# Patient Record
Sex: FEMALE | Race: WHITE | NOT HISPANIC OR LATINO
[De-identification: names, ages, dates, MRNs, and addresses within clinical notes are randomized per-mention and may not be internally consistent; named-entity substitution may affect disease eponyms.]

---

## 2017-08-02 ENCOUNTER — APPOINTMENT (OUTPATIENT)
Dept: OBGYN | Facility: CLINIC | Age: 57
End: 2017-08-02

## 2017-08-25 ENCOUNTER — APPOINTMENT (OUTPATIENT)
Dept: INFECTIOUS DISEASE | Facility: CLINIC | Age: 57
End: 2017-08-25
Payer: SELF-PAY

## 2017-08-25 PROCEDURE — 90717 YELLOW FEVER VACCINE SUBQ: CPT

## 2017-08-25 PROCEDURE — 99401 PREV MED CNSL INDIV APPRX 15: CPT | Mod: 25

## 2017-08-25 PROCEDURE — 90473 IMMUNE ADMIN ORAL/NASAL: CPT | Mod: NC

## 2017-08-25 PROCEDURE — 90472 IMMUNIZATION ADMIN EACH ADD: CPT | Mod: NC

## 2017-08-25 PROCEDURE — 90715 TDAP VACCINE 7 YRS/> IM: CPT

## 2017-08-25 PROCEDURE — 90690 TYPHOID VACCINE ORAL: CPT

## 2017-08-25 PROCEDURE — 90636 HEP A/HEP B VACC ADULT IM: CPT

## 2017-09-22 ENCOUNTER — APPOINTMENT (OUTPATIENT)
Dept: INFECTIOUS DISEASE | Facility: CLINIC | Age: 57
End: 2017-09-22
Payer: SELF-PAY

## 2017-09-22 PROCEDURE — 90471 IMMUNIZATION ADMIN: CPT | Mod: NC

## 2017-09-22 PROCEDURE — 90636 HEP A/HEP B VACC ADULT IM: CPT

## 2017-10-03 ENCOUNTER — APPOINTMENT (OUTPATIENT)
Dept: OBGYN | Facility: CLINIC | Age: 57
End: 2017-10-03

## 2017-10-19 ENCOUNTER — RX RENEWAL (OUTPATIENT)
Age: 57
End: 2017-10-19

## 2017-10-29 RX ORDER — ATOVAQUONE AND PROGUANIL HYDROCHLORIDE 250; 100 MG/1; MG/1
250-100 TABLET, FILM COATED ORAL
Qty: 7 | Refills: 0 | Status: ACTIVE | COMMUNITY
Start: 2017-08-25

## 2017-12-04 ENCOUNTER — RESULT REVIEW (OUTPATIENT)
Age: 57
End: 2017-12-04

## 2017-12-05 ENCOUNTER — APPOINTMENT (OUTPATIENT)
Dept: OBGYN | Facility: CLINIC | Age: 57
End: 2017-12-05
Payer: COMMERCIAL

## 2017-12-05 PROCEDURE — 99396 PREV VISIT EST AGE 40-64: CPT

## 2017-12-05 PROCEDURE — 36415 COLL VENOUS BLD VENIPUNCTURE: CPT

## 2017-12-11 ENCOUNTER — APPOINTMENT (OUTPATIENT)
Dept: OBGYN | Facility: CLINIC | Age: 57
End: 2017-12-11

## 2018-03-02 ENCOUNTER — APPOINTMENT (OUTPATIENT)
Dept: INFECTIOUS DISEASE | Facility: CLINIC | Age: 58
End: 2018-03-02

## 2018-04-13 ENCOUNTER — APPOINTMENT (OUTPATIENT)
Dept: INFECTIOUS DISEASE | Facility: CLINIC | Age: 58
End: 2018-04-13
Payer: SELF-PAY

## 2018-04-13 PROCEDURE — 90471 IMMUNIZATION ADMIN: CPT | Mod: NC

## 2018-04-13 PROCEDURE — 90636 HEP A/HEP B VACC ADULT IM: CPT

## 2018-12-13 ENCOUNTER — APPOINTMENT (OUTPATIENT)
Dept: OBGYN | Facility: CLINIC | Age: 58
End: 2018-12-13

## 2019-01-24 ENCOUNTER — APPOINTMENT (OUTPATIENT)
Dept: OBGYN | Facility: CLINIC | Age: 59
End: 2019-01-24
Payer: COMMERCIAL

## 2019-01-24 ENCOUNTER — RESULT REVIEW (OUTPATIENT)
Age: 59
End: 2019-01-24

## 2019-01-24 PROCEDURE — 36415 COLL VENOUS BLD VENIPUNCTURE: CPT

## 2019-01-24 PROCEDURE — 99396 PREV VISIT EST AGE 40-64: CPT

## 2019-08-27 ENCOUNTER — APPOINTMENT (OUTPATIENT)
Dept: OBGYN | Facility: CLINIC | Age: 59
End: 2019-08-27
Payer: COMMERCIAL

## 2019-08-27 PROCEDURE — 36415 COLL VENOUS BLD VENIPUNCTURE: CPT

## 2019-08-27 PROCEDURE — 99213 OFFICE O/P EST LOW 20 MIN: CPT

## 2019-09-20 PROBLEM — N64.4 PAIN OF BOTH BREASTS: Status: ACTIVE | Noted: 2019-09-20

## 2019-09-23 ENCOUNTER — APPOINTMENT (OUTPATIENT)
Dept: SURGICAL ONCOLOGY | Facility: CLINIC | Age: 59
End: 2019-09-23
Payer: COMMERCIAL

## 2019-09-23 VITALS
WEIGHT: 153 LBS | OXYGEN SATURATION: 97 % | BODY MASS INDEX: 24.59 KG/M2 | HEIGHT: 66 IN | DIASTOLIC BLOOD PRESSURE: 79 MMHG | HEART RATE: 66 BPM | SYSTOLIC BLOOD PRESSURE: 131 MMHG

## 2019-09-23 DIAGNOSIS — N64.4 MASTODYNIA: ICD-10-CM

## 2019-09-23 PROCEDURE — 99204 OFFICE O/P NEW MOD 45 MIN: CPT

## 2019-10-28 ENCOUNTER — APPOINTMENT (OUTPATIENT)
Dept: INFECTIOUS DISEASE | Facility: CLINIC | Age: 59
End: 2019-10-28

## 2019-11-04 ENCOUNTER — APPOINTMENT (OUTPATIENT)
Dept: INFECTIOUS DISEASE | Facility: CLINIC | Age: 59
End: 2019-11-04
Payer: SELF-PAY

## 2019-11-04 DIAGNOSIS — Z71.84 ENC FOR HEALTH COUNSELING RELATED TO TRAVEL: ICD-10-CM

## 2019-11-04 PROCEDURE — 99401 PREV MED CNSL INDIV APPRX 15: CPT

## 2019-11-04 RX ORDER — ATOVAQUONE AND PROGUANIL HYDROCHLORIDE 250; 100 MG/1; MG/1
250-100 TABLET, FILM COATED ORAL DAILY
Qty: 14 | Refills: 0 | Status: ACTIVE | COMMUNITY
Start: 2019-11-04 | End: 1900-01-01

## 2019-12-16 ENCOUNTER — APPOINTMENT (OUTPATIENT)
Dept: OBGYN | Facility: CLINIC | Age: 59
End: 2019-12-16
Payer: COMMERCIAL

## 2019-12-16 PROCEDURE — 99213 OFFICE O/P EST LOW 20 MIN: CPT

## 2020-07-29 ENCOUNTER — APPOINTMENT (OUTPATIENT)
Dept: OBGYN | Facility: CLINIC | Age: 60
End: 2020-07-29

## 2020-08-21 ENCOUNTER — APPOINTMENT (OUTPATIENT)
Dept: OBGYN | Facility: CLINIC | Age: 60
End: 2020-08-21
Payer: COMMERCIAL

## 2020-08-21 PROCEDURE — 36415 COLL VENOUS BLD VENIPUNCTURE: CPT

## 2020-08-21 PROCEDURE — 99213 OFFICE O/P EST LOW 20 MIN: CPT

## 2020-09-30 ENCOUNTER — APPOINTMENT (OUTPATIENT)
Dept: SURGICAL ONCOLOGY | Facility: CLINIC | Age: 60
End: 2020-09-30
Payer: COMMERCIAL

## 2020-09-30 VITALS
SYSTOLIC BLOOD PRESSURE: 134 MMHG | RESPIRATION RATE: 15 BRPM | HEIGHT: 66 IN | WEIGHT: 148.5 LBS | HEART RATE: 59 BPM | OXYGEN SATURATION: 97 % | BODY MASS INDEX: 23.87 KG/M2 | DIASTOLIC BLOOD PRESSURE: 74 MMHG

## 2020-09-30 PROCEDURE — 99214 OFFICE O/P EST MOD 30 MIN: CPT

## 2020-09-30 NOTE — PHYSICAL EXAM
[Normal] : supple, no neck mass and thyroid not enlarged [Normal Neck Lymph Nodes] : normal neck lymph nodes  [Normal Supraclavicular Lymph Nodes] : normal supraclavicular lymph nodes [Normal Axillary Lymph Nodes] : normal axillary lymph nodes [Normal] : normal appearance, no rash, nodules, vesicles, ulcers, erythema [de-identified] : Groins not examined [de-identified] : below

## 2020-09-30 NOTE — REASON FOR VISIT
[Initial Consultation] : an initial consultation for [Other: _____] : [unfilled] [FreeTextEntry2] : Abnormal breast exam, breast pain

## 2020-09-30 NOTE — ASSESSMENT
[FreeTextEntry1] : August 2019:\par Bilateral mammogram and sonogram at [facility]: BI-RADS 2.\par \par \par Clinically, I cannot determine a cause for her breast pain, sensitivity, and tenderness today.\par She will likely be resuming HRT because of significant post menopausal symptoms.\par We'll continue to self monitor itself evaluated notify me of any changes\par \par I suggested short time re imaging on her symptomatic (right) side and provided her with a prescription for a diagnostic mammogram and sonogram had a 6 month interval, February 2020.\par \par We should see her after that, sooner if needed.\par \par Reviewed in detail, all questions answered.\par \par

## 2020-09-30 NOTE — HISTORY OF PRESENT ILLNESS
[de-identified] : 58-year-old lady referred by her gynecologist Dr. Allyson MANN for breast examination.\par \par Since August 2019 she reports:\par #1. Equivocal palpable area right lower inner quadrant on Physical examination, By her gynecologist.\par Her on breast self examination is normal.\par #2. Pain/sensitivity in the lower inner portion of her right breast, and some sensitivity associated with the right nipple.\par \par In retrospect, the onset of her symptoms are temporally related to an over-application upper topical hormone replacement therapy (an estrogen/progesterone combination which she has been on since menopause at age 49.\par She stopped the medication August 27, 2019; not sure if the signs and symptoms have subsided, yet.\par \par No other concerns related to either breast.\par \par No previous breast biopsies.\par \par No family history of breast cancer.\par \par No relatives with ovarian cancer.\par \par Not Ashkenazi.\par \par Her father had prostate cancer.\par Her mother had Merkel cell cancer.\par \par Menarche at 10.\par The left breast.\par Natural menopause at 49.\par + HRT since her menopause.\par \par Breast cancer risk score 14\par \par \par Her internist is Dr. Torri FISH\par \par No pacemaker or defibrillator.\par No anticoagulants.\par \par Her only current medication is treximet for migraines.\par Her neurologist is Dr. Gisele Rodriguez\par \par \par January 2019 Pap smear, Dr. Allyson Mann, okay\par \par

## 2020-10-05 NOTE — PHYSICAL EXAM
[Normal] : supple, no neck mass and thyroid not enlarged [Normal Neck Lymph Nodes] : normal neck lymph nodes  [Normal Supraclavicular Lymph Nodes] : normal supraclavicular lymph nodes [Normal Axillary Lymph Nodes] : normal axillary lymph nodes [Normal] : normal appearance, no rash, nodules, vesicles, ulcers, erythema [de-identified] : Groins not examined [de-identified] : below

## 2020-10-05 NOTE — ASSESSMENT
[FreeTextEntry1] : She reports that she had bilateral breast imaging at [facility] in [place], which were normal.\par Await official reports: (9-28-20: Bilateral mammogram and sonogram @[facility]: BI-RADS 2).\par \par The cause of her discomfort is not apparent on today's examination.\par \par She does not smoke, does not have caffeine intake, and takes no exogenous hormones.\par He is under the care of Dr. Justine Quinteros for holistic care, so I did not want to make recommendations for conservative interventions for symptomatic relief.\par \par I have suggested that she return for short-term follow-up in 4 to 6 months, sooner if needed.\par \par

## 2020-10-05 NOTE — HISTORY OF PRESENT ILLNESS
[de-identified] : 59-year-old lady initially seen in September 2019.\par She had the following concerns:\par 1.  Right breast (lower inner) pain/sensitivity.\par 2.  Equivocal palpable area on examination by her gynecologist, also right breast (lower inner).\par She reportedly normal breast self-examination.\par \par My physical examination:\par No visible or palpable abnormalities.\par Some sensitivity to palpation in the lower inner quadrant of the right breast.\par No adenopathy.\par \par August 2019:\par Bilateral mammogram and sonogram at [facility] BI-RADS 2.\par \par I had suggested short-term follow-up re-imaging on the right, in 6 months, February 2020, and given her a prescription.\par I suggested a follow-up visit after that.\par We have no record of the repeat imaging being performed.\par This is her first return visit, since September 2019.\par \par Since ~May 2020 she has had intermittent, poorly localized, bilateral breast sensitivity.\par She stopped HRT in March 2020.\par She has no caffeine intake, and is a non-smoker.\par On September 28, 2020, while leaning forward to clean her cat litter box, she experienced very sharp pain in the right breast.\par This pain did not radiate.\par It has subsided but not resolved.\par Neither previously, nor presently, does she have any other signs or symptoms related to either breast.\par \par No previous breast biopsies.\par \par No relatives with breast cancer.\par No relatives with ovarian cancer.\par \par Not Ashkenazi.\par \par Relatives with a history of malignancy:\par Father: Prostate cancer.\par Mother: Merkel cell cancer.\par \par Reproductive history:\par Menarche at 10.\par Nulliparous.\par Natural menopause at 49.\par No HRT, since March 2020.\par \par Breast cancer risk score 14.\par \par PMD: Dr. Torri FISH.\par \par No pacemaker or defibrillator.\par No anticoagulants.\par \par Migraines treated with Treximet.\par Neurology: Dr. Gisele KANG.\par \par \par January 2019 Pap smear okay.\par Gynecologist: Dr. Allyson FLORES.\par \par Since August 2020 she is started seeing Dr. Justine RAMIREZ for more comprehensive healthcare.\par \par \par Colonoscopy.  In 2015 was unremarkable.\par Minded that she is due, she will check with her internist to see who she had gone to previously.

## 2020-10-16 ENCOUNTER — TRANSCRIPTION ENCOUNTER (OUTPATIENT)
Age: 60
End: 2020-10-16

## 2020-11-16 ENCOUNTER — APPOINTMENT (OUTPATIENT)
Dept: NEUROLOGY | Facility: CLINIC | Age: 60
End: 2020-11-16
Payer: COMMERCIAL

## 2020-11-16 VITALS — TEMPERATURE: 97.8 F

## 2020-11-16 PROCEDURE — 95816 EEG AWAKE AND DROWSY: CPT

## 2021-07-23 ENCOUNTER — APPOINTMENT (OUTPATIENT)
Dept: GASTROENTEROLOGY | Facility: CLINIC | Age: 61
End: 2021-07-23

## 2021-09-09 ENCOUNTER — APPOINTMENT (OUTPATIENT)
Dept: GASTROENTEROLOGY | Facility: CLINIC | Age: 61
End: 2021-09-09

## 2021-09-24 ENCOUNTER — APPOINTMENT (OUTPATIENT)
Dept: GASTROENTEROLOGY | Facility: CLINIC | Age: 61
End: 2021-09-24
Payer: COMMERCIAL

## 2021-09-24 VITALS
SYSTOLIC BLOOD PRESSURE: 124 MMHG | HEIGHT: 66 IN | TEMPERATURE: 96.9 F | BODY MASS INDEX: 24.35 KG/M2 | WEIGHT: 151.5 LBS | OXYGEN SATURATION: 98 % | HEART RATE: 69 BPM | DIASTOLIC BLOOD PRESSURE: 72 MMHG

## 2021-09-24 DIAGNOSIS — Z83.71 ENCOUNTER FOR SCREENING FOR MALIGNANT NEOPLASM OF COLON: ICD-10-CM

## 2021-09-24 DIAGNOSIS — Z12.11 ENCOUNTER FOR SCREENING FOR MALIGNANT NEOPLASM OF COLON: ICD-10-CM

## 2021-09-24 PROCEDURE — 99202 OFFICE O/P NEW SF 15 MIN: CPT

## 2021-09-24 RX ORDER — TOCOPHERSOLAN (VITAMIN E TPGS) 400/15ML
LIQUID (ML) ORAL
Refills: 0 | Status: ACTIVE | COMMUNITY

## 2021-09-24 RX ORDER — MULTIVIT-MIN/IRON/FOLIC ACID/K 18-600-40
CAPSULE ORAL
Refills: 0 | Status: ACTIVE | COMMUNITY

## 2021-09-24 RX ORDER — ZINC SULFATE 50(220)MG
CAPSULE ORAL
Refills: 0 | Status: ACTIVE | COMMUNITY

## 2021-09-24 RX ORDER — CHROMIUM 200 MCG
TABLET ORAL
Refills: 0 | Status: ACTIVE | COMMUNITY

## 2021-09-24 NOTE — ASSESSMENT
[FreeTextEntry1] : The patient is a 60-year-old female who enjoys good health.  Currently she  is on no prescription medication.  The patient is due for repeat colorectal cancer screening due to the fact she has a family history of colon polyps.  The risk benefits were thoroughly described and all questions were answered.  Once performed I will distribute a copy of the results.  The exam should be repeated at 5-year intervals due to her family history.\par

## 2021-09-24 NOTE — HISTORY OF PRESENT ILLNESS
[FreeTextEntry1] : I saw patient Nicole Page in the office today.  Patient is a 60-year-old female who enjoys good health.  She has no history of hypertension diabetes or coronary artery disease and her appetite is good with no dysphagia or unexplained weight loss.  Patient's bowel movements are usually normal with no blood in the stool or on the toilet tissue and the patient is up-to-date on her gynecological examinations.  The patient has a history of migraines which have become much less problematic since she is off hormonal replacement therapy.  Patient has 1 to 2 cups of caffeine a day at the present time rarely has ethanol and does not smoke.  Patient has a family history of colon polyps and had a colonoscopy done approximately 5 years ago.  The patient is due for repeat exam.

## 2021-10-04 RX ORDER — SODIUM PICOSULFATE, MAGNESIUM OXIDE, AND ANHYDROUS CITRIC ACID 10; 3.5; 12 MG/160ML; G/160ML; G/160ML
10-3.5-12 MG-GM LIQUID ORAL
Qty: 2 | Refills: 0 | Status: ACTIVE | COMMUNITY
Start: 2021-10-04 | End: 1900-01-01

## 2021-11-15 DIAGNOSIS — Z20.822 CONTACT WITH AND (SUSPECTED) EXPOSURE TO COVID-19: ICD-10-CM

## 2021-11-15 DIAGNOSIS — Z01.818 ENCOUNTER FOR OTHER PREPROCEDURAL EXAMINATION: ICD-10-CM

## 2021-11-16 ENCOUNTER — APPOINTMENT (OUTPATIENT)
Dept: GASTROENTEROLOGY | Facility: AMBULATORY MEDICAL SERVICES | Age: 61
End: 2021-11-16

## 2022-07-05 LAB — SARS-COV-2 N GENE NPH QL NAA+PROBE: NOT DETECTED

## 2022-07-06 ENCOUNTER — APPOINTMENT (OUTPATIENT)
Dept: GASTROENTEROLOGY | Facility: AMBULATORY MEDICAL SERVICES | Age: 62
End: 2022-07-06

## 2022-07-06 PROCEDURE — ZZZZZ: CPT

## 2022-07-06 PROCEDURE — 45378 DIAGNOSTIC COLONOSCOPY: CPT | Mod: 33

## 2022-07-07 ENCOUNTER — APPOINTMENT (OUTPATIENT)
Dept: GASTROENTEROLOGY | Facility: CLINIC | Age: 62
End: 2022-07-07

## 2022-07-07 ENCOUNTER — NON-APPOINTMENT (OUTPATIENT)
Age: 62
End: 2022-07-07

## 2022-07-07 ENCOUNTER — APPOINTMENT (OUTPATIENT)
Dept: RADIOLOGY | Facility: CLINIC | Age: 62
End: 2022-07-07

## 2022-07-07 ENCOUNTER — OUTPATIENT (OUTPATIENT)
Dept: OUTPATIENT SERVICES | Facility: HOSPITAL | Age: 62
LOS: 1 days | End: 2022-07-07
Payer: COMMERCIAL

## 2022-07-07 VITALS
HEART RATE: 64 BPM | TEMPERATURE: 97.3 F | DIASTOLIC BLOOD PRESSURE: 60 MMHG | SYSTOLIC BLOOD PRESSURE: 110 MMHG | RESPIRATION RATE: 98 BRPM | BODY MASS INDEX: 23.95 KG/M2 | HEIGHT: 66 IN | WEIGHT: 149 LBS

## 2022-07-07 DIAGNOSIS — R10.32 LEFT LOWER QUADRANT PAIN: ICD-10-CM

## 2022-07-07 PROCEDURE — 74019 RADEX ABDOMEN 2 VIEWS: CPT | Mod: 26

## 2022-07-07 PROCEDURE — 45378 DIAGNOSTIC COLONOSCOPY: CPT | Mod: 33

## 2022-07-07 PROCEDURE — 74019 RADEX ABDOMEN 2 VIEWS: CPT

## 2022-07-07 PROCEDURE — 99213 OFFICE O/P EST LOW 20 MIN: CPT | Mod: 25

## 2022-07-07 RX ORDER — AMOXICILLIN AND CLAVULANATE POTASSIUM 500; 125 MG/1; MG/1
500-125 TABLET, FILM COATED ORAL 3 TIMES DAILY
Qty: 21 | Refills: 0 | Status: ACTIVE | COMMUNITY
Start: 2022-07-07 | End: 1900-01-01

## 2022-07-07 RX ORDER — ALBUTEROL SULFATE 90 UG/1
108 (90 BASE) POWDER, METERED RESPIRATORY (INHALATION)
Qty: 1 | Refills: 0 | Status: ACTIVE | COMMUNITY
Start: 2022-03-24

## 2022-07-07 NOTE — HISTORY OF PRESENT ILLNESS
[FreeTextEntry1] : I saw patient Nicole Page in follow-up today.  The patient is a 61-year-old female who enjoys good health.  The patient was seen earlier in the week for a colonoscopy and underwent an uneventful surveillance colonoscopy for family history.  The patient did well immediately after the procedure but yesterday called saying that she noted some crampy abdominal discomfort with frequent bowel movements.  There was no fever chills nausea or vomiting and the patient was able to continue eating.  This morning I called for follow-up and the patient states she is noticing some pain with bloating.  Is not had any further bowel movements.  Again there was no nausea vomiting fever or chills.  The patient presents to the office for an examination.

## 2022-07-07 NOTE — ASSESSMENT
[FreeTextEntry1] : Mrs Page is a 61-year-old female who presents with abdominal pain after seemingly uneventful colonoscopy.  She was diagnosed with diverticulosis.  Today's exam is not suggestive of a significant ileus or perforation.  The patient may have developed a low-level diverticulitis.  The patient was sent for a flatplate of the abdomen and pelvis and started on Augmentin.  Patient was told to stay on a low residue diet and if the symptoms escalate call me immediately or go to the emergency room.  The patient is not demonstrating any red flag signs such as tachycardia vomiting or fever.  I will call the patient once the flatplate results are available which was ordered stat.  The prescription was submitted to the pharmacy for Augmentin.

## 2022-07-07 NOTE — PHYSICAL EXAM
[General Appearance - Alert] : alert [General Appearance - In No Acute Distress] : in no acute distress [General Appearance - Well Nourished] : well nourished [General Appearance - Well Developed] : well developed [] : no respiratory distress [Respiration, Rhythm And Depth] : normal respiratory rhythm and effort [Auscultation Breath Sounds / Voice Sounds] : lungs were clear to auscultation bilaterally [Apical Impulse] : the apical impulse was normal [Heart Rate And Rhythm] : heart rate was normal and rhythm regular [Bowel Sounds] : normal bowel sounds [Abdomen Soft] : soft [FreeTextEntry1] : There is tenderness on deep palpation of the left lower quadrant with no rebound.  Abdomen is not significantly distended and the bowel sounds are active.

## 2022-07-11 RX ORDER — HYOSCYAMINE SULFATE 0.38 MG/1
0.38 TABLET, EXTENDED RELEASE ORAL
Qty: 30 | Refills: 1 | Status: ACTIVE | COMMUNITY
Start: 2022-07-11 | End: 1900-01-01

## 2022-09-15 PROBLEM — N60.99 BENIGN BREAST DISEASE: Status: ACTIVE | Noted: 2020-09-30

## 2022-09-16 ENCOUNTER — APPOINTMENT (OUTPATIENT)
Dept: SURGICAL ONCOLOGY | Facility: CLINIC | Age: 62
End: 2022-09-16

## 2022-09-16 VITALS
BODY MASS INDEX: 24.11 KG/M2 | OXYGEN SATURATION: 97 % | DIASTOLIC BLOOD PRESSURE: 71 MMHG | HEART RATE: 66 BPM | WEIGHT: 150 LBS | SYSTOLIC BLOOD PRESSURE: 115 MMHG | HEIGHT: 66 IN | RESPIRATION RATE: 16 BRPM

## 2022-09-16 DIAGNOSIS — N60.99 UNSPECIFIED BENIGN MAMMARY DYSPLASIA OF UNSPECIFIED BREAST: ICD-10-CM

## 2022-09-16 PROCEDURE — 99215 OFFICE O/P EST HI 40 MIN: CPT

## 2022-09-16 NOTE — REASON FOR VISIT
[Follow-Up Visit] : a follow-up visit for [Other: _____] : [unfilled] [FreeTextEntry2] : Annual breast exam, breast cancer risk score = 14

## 2022-09-16 NOTE — HISTORY OF PRESENT ILLNESS
[de-identified] : 61 year-old lady.\par \par Since the first week of July 2022 she has had intermittent pain and tenderness in the central part of the right breast, and tenderness in a similar location on the left.\par No preceding injury or strain.\par No provocative or palliative factors.\par No other signs or symptoms related to either breast.\par \par July 2022 she saw her gynecologist (below).\par There was concern about a possible palpable abnormality in the upper outer quadrant of the right breast on her physical examination, but the patient's on BSE was normal, and remains that way.\par August 2022 right breast sonogram at [facility] was reportedly normal.\par \par She drinks 3-4 caffeinated beverages daily.\par Non-smoker.\par No exogenous hormones\par \par \par + Prior personal history\par She was initially seen in September 2019.\par She had the following concerns:\par 1.  Right breast (lower inner) pain/sensitivity.\par 2.  Equivocal palpable area on examination by her gynecologist, also right breast (lower inner).\par She had a normal breast self-examination.\par \par My physical examination:\par No visible or palpable abnormalities.\par Some sensitivity to palpation in the lower inner quadrant of the right breast.\par No adenopathy.\par \par \par In May 2020 she had intermittent, poorly localized, bilateral breast sensitivity.\par She stopped HRT in March 2020.\par She has no caffeine intake, and is a non-smoker.\par On September 28, 2020, while leaning forward to clean her cat litter box, she experienced very sharp pain in the right breast.\par This pain did not radiate.\par \par \par No previous breast biopsies.\par \par No relatives with breast cancer.\par No relatives with ovarian cancer.\par \par Not Ashkenazi.\par \par Relatives with a history of malignancy:\par Father: Prostate cancer.\par Mother: Merkel cell cancer.\par \par Reproductive history:\par Menarche at 10.\par Nulliparous.\par Natural menopause at 49.\par No HRT, since March 2020.\par \par \par Breast cancer risk score 14.\par \par \par PMD: She sees Dr. Justine VASQUEZ for everything\par \par No pacemaker or defibrillator.\par No anticoagulants.\par \par Migraines treated with Treximet.\par Neurology: Dr. Gisele KANG.\par \par \par August 2022 Pap smear okay.\par Dr. Justine vasquez.\par She used to see Dr. Allyson Mann\par \par Since August 2020 she is started seeing Dr. Justine VASQUEZ for more comprehensive healthcare.\par \par \par Colonoscopy.  In 2015 was unremarkable.\par Minded that she is due, she will check with her internist to see who she had gone to previously..........................

## 2022-09-16 NOTE — ASSESSMENT
[FreeTextEntry1] : November 2021:\par Bilateral mammogram and sonogram at [facility] radiology was unremarkable: BI-RADS 2.\par August 2022 right breast ultrasound at the same location was normal.\par \par Given the recent pain and tenderness in the right breast I suggest that she have a bilateral mammogram and sonogram now and gave her appropriate prescriptions.\par \par I have asked her to call me 1 week after the imaging to discuss the results.\par \par She will also follow up with her women's care specialist.\par \par Further management based upon our discussion after her imaging\par \par \par \par

## 2022-09-16 NOTE — PHYSICAL EXAM
[Normal] : supple, no neck mass and thyroid not enlarged [Normal Neck Lymph Nodes] : normal neck lymph nodes  [Normal Supraclavicular Lymph Nodes] : normal supraclavicular lymph nodes [Normal Axillary Lymph Nodes] : normal axillary lymph nodes [Normal] : normal appearance, no rash, nodules, vesicles, ulcers, erythema [de-identified] : Groins not examined [de-identified] : below

## 2023-06-12 ENCOUNTER — APPOINTMENT (OUTPATIENT)
Dept: GASTROENTEROLOGY | Facility: CLINIC | Age: 63
End: 2023-06-12

## 2023-10-18 ENCOUNTER — OFFICE (OUTPATIENT)
Dept: URBAN - METROPOLITAN AREA CLINIC 35 | Facility: CLINIC | Age: 63
Setting detail: OPHTHALMOLOGY
End: 2023-10-18
Payer: COMMERCIAL

## 2023-10-18 DIAGNOSIS — H11.153: ICD-10-CM

## 2023-10-18 DIAGNOSIS — G43.109: ICD-10-CM

## 2023-10-18 DIAGNOSIS — B97.7: ICD-10-CM

## 2023-10-18 DIAGNOSIS — H02.34: ICD-10-CM

## 2023-10-18 DIAGNOSIS — H02.31: ICD-10-CM

## 2023-10-18 PROCEDURE — 92014 COMPRE OPH EXAM EST PT 1/>: CPT | Performed by: OPHTHALMOLOGY

## 2023-10-18 ASSESSMENT — REFRACTION_AUTOREFRACTION
OS_CYLINDER: +1.75
OS_AXIS: 001
OS_SPHERE: +1.25
OD_CYLINDER: +2.00
OD_AXIS: 014
OD_SPHERE: -0.75

## 2023-10-18 ASSESSMENT — REFRACTION_MANIFEST
OD_VA1: 20/20
OD_VA1: 20/20
OS_AXIS: 010
OD_SPHERE: -1.00
OD_CYLINDER: +1.50
OD_CYLINDER: +1.50
OS_ADD: +3.50
OD_SPHERE: -1.00
OD_AXIS: 010
OS_VA1: 20/20
OS_VA1: 20/20
OD_AXIS: 010
OS_AXIS: 180
OS_CYLINDER: +1.00
OS_ADD: +3.25
OS_CYLINDER: +0.75
OS_SPHERE: -1.00
OS_SPHERE: -1.00
OD_ADD: +3.25
OD_ADD: +3.50

## 2023-10-18 ASSESSMENT — REFRACTION_CURRENTRX
OD_OVR_VA: 20/
OS_OVR_VA: 20/
OD_SPHERE: -1.00
OD_AXIS: 009
OS_CYLINDER: +0.75
OS_OVR_VA: 20/
OD_ADD: +3.00
OS_VPRISM_DIRECTION: PROGS
OD_VPRISM_DIRECTION: PROGS
OS_SPHERE: -0.75
OS_OVR_VA: 20/
OS_AXIS: 006
OS_ADD: +3.00
OD_CYLINDER: +1.50

## 2023-10-18 ASSESSMENT — KERATOMETRY
OD_K2POWER_DIOPTERS: 44.25
OD_AXISANGLE_DEGREES: 027
OS_K1POWER_DIOPTERS: 43.00
OD_K1POWER_DIOPTERS: 43.25
OS_AXISANGLE_DEGREES: 169
OS_K2POWER_DIOPTERS: 44.00
METHOD_AUTO_MANUAL: AUTO

## 2023-10-18 ASSESSMENT — TONOMETRY
OS_IOP_MMHG: 13
OD_IOP_MMHG: 13

## 2023-10-18 ASSESSMENT — LID POSITION - COMMENTS
OS_COMMENTS: BLEPHAROCHALASIS
OD_COMMENTS: BLEPHAROCHALASIS

## 2023-10-18 ASSESSMENT — AXIALLENGTH_DERIVED
OD_AL: 23.5975
OS_AL: 23.8379
OS_AL: 22.7921
OS_AL: 23.7883
OD_AL: 23.4042
OD_AL: 23.5975

## 2023-10-18 ASSESSMENT — SPHEQUIV_DERIVED
OS_SPHEQUIV: -0.5
OS_SPHEQUIV: 2.125
OD_SPHEQUIV: -0.25
OS_SPHEQUIV: -0.625
OD_SPHEQUIV: 0.25
OD_SPHEQUIV: -0.25

## 2023-10-18 ASSESSMENT — VISUAL ACUITY
OD_BCVA: 20/25-1
OS_BCVA: 20/25

## 2023-10-18 ASSESSMENT — CONFRONTATIONAL VISUAL FIELD TEST (CVF)
OS_FINDINGS: FULL
OD_FINDINGS: FULL

## 2024-03-06 ENCOUNTER — APPOINTMENT (OUTPATIENT)
Dept: THORACIC SURGERY | Facility: CLINIC | Age: 64
End: 2024-03-06
Payer: COMMERCIAL

## 2024-03-06 VITALS
OXYGEN SATURATION: 96 % | SYSTOLIC BLOOD PRESSURE: 147 MMHG | WEIGHT: 153 LBS | BODY MASS INDEX: 24.59 KG/M2 | HEIGHT: 66 IN | RESPIRATION RATE: 17 BRPM | HEART RATE: 70 BPM | DIASTOLIC BLOOD PRESSURE: 88 MMHG

## 2024-03-06 DIAGNOSIS — R91.1 SOLITARY PULMONARY NODULE: ICD-10-CM

## 2024-03-06 PROCEDURE — 99205 OFFICE O/P NEW HI 60 MIN: CPT

## 2024-03-06 RX ORDER — AMOXICILLIN AND CLAVULANATE POTASSIUM 500; 125 MG/1; MG/1
500-125 TABLET, FILM COATED ORAL
Qty: 14 | Refills: 0 | Status: ACTIVE | COMMUNITY
Start: 2024-03-06 | End: 1900-01-01

## 2024-03-08 PROBLEM — R91.1 LUNG NODULE: Status: ACTIVE | Noted: 2024-03-06

## 2024-03-08 RX ORDER — AMOXICILLIN AND CLAVULANATE POTASSIUM 500; 125 MG/1; MG/1
500-125 TABLET, FILM COATED ORAL
Qty: 20 | Refills: 0 | Status: COMPLETED | COMMUNITY
Start: 2022-04-20 | End: 2024-03-08

## 2024-03-08 RX ORDER — AZITHROMYCIN 250 MG/1
250 TABLET, FILM COATED ORAL
Qty: 4 | Refills: 0 | Status: COMPLETED | COMMUNITY
Start: 2019-11-04 | End: 2024-03-08

## 2024-03-08 RX ORDER — AZITHROMYCIN 250 MG/1
250 TABLET, FILM COATED ORAL
Qty: 4 | Refills: 0 | Status: COMPLETED | COMMUNITY
Start: 2017-08-25 | End: 2024-03-08

## 2024-03-08 NOTE — CONSULT LETTER
[Dear  ___] : Dear  [unfilled], [Consult Letter:] : I had the pleasure of evaluating your patient, [unfilled]. [( Thank you for referring [unfilled] for consultation for _____ )] : Thank you for referring [unfilled] for consultation for [unfilled] [Please see my note below.] : Please see my note below. [Consult Closing:] : Thank you very much for allowing me to participate in the care of this patient.  If you have any questions, please do not hesitate to contact me. [Sincerely,] : Sincerely, [FreeTextEntry2] : Dr. Tino Zarate (Pulm/Referring) [FreeTextEntry3] : Cullen Lane MD, MPH  System Director of Thoracic Surgery  Director of Comprehensive Lung and Foregut Staples  Professor Cardiovascular & Thoracic Surgery   [facility] [employer] at [facility] [phone] [place] [address] [place] [address] Tel: [phone] Fax: [phone]

## 2024-03-08 NOTE — ASSESSMENT
[FreeTextEntry1] : Ms. SEEMA ROLDAN, 63 year old female, no medical history obtained from pt (pt refused to fill out intake form or NP assessment), referred by Pulm Dr. Tino Zarate for lung nodule.   CT Chest on 5/9/24 at : - Linear density seen in the lingula segment extending towards a peripheral nodular density measuring 1.2 x 1.1 cm. This is a nonspecific finding but most likely is related to scarring or subsegmental atelectasis. - No additional lung nodules are identified  PET/CT on 2/23/24 at : - a 1.2 x 1.2 cm max SUV 0.9 left lingula density (image 101), same as adjacent lung parenchyma background level. - No other suspicious lung nodule or focal uptake. - no suspicious hilar or mediastinal uptake or lymphadenopathy. - no pleural effusion.  I have reviewed the patient's medical records and diagnostic images at time of this office consultation and have made the following recommendation: 1. Scans reviewed and details discussion with pt and . Lung nodule looks like inflammatory process to me, my recommendation is to give a trial of Abx then repeat the CT Chest in 3 mons. If the lung nodule persists or becomes larger, will discuss options such as FNA vs. surgical resection. Pt verbalized understanding and agreed with the plan. Rx Augmentin sent to pharmacy.    I, CEFERINO Mckeon, personally performed the evaluation and management (E/M) services for this new patient.  That E/M includes conducting the initial examination, assessing all conditions, and establishing the plan of care.  Today, my ACP, Kacie Caban, LIN-BC was here to observe my evaluation and management services for this patient to be followed going forward.

## 2024-03-08 NOTE — DATA REVIEWED
[FreeTextEntry1] : I have independently reviewed the following: CT Chest on 5/9/24 at  PET/CT on 2/23/24 at

## 2024-11-19 ENCOUNTER — DOCTOR'S OFFICE (OUTPATIENT)
Facility: LOCATION | Age: 64
Setting detail: OPHTHALMOLOGY
End: 2024-11-19
Payer: COMMERCIAL

## 2024-11-19 DIAGNOSIS — L03.213: ICD-10-CM

## 2024-11-19 PROCEDURE — 99213 OFFICE O/P EST LOW 20 MIN: CPT | Performed by: OPHTHALMOLOGY

## 2024-11-19 ASSESSMENT — VISUAL ACUITY
OD_BCVA: 20/25-1
OS_BCVA: 20/25

## 2024-11-19 ASSESSMENT — REFRACTION_MANIFEST
OS_SPHERE: -1.00
OS_CYLINDER: +1.00
OS_CYLINDER: +0.75
OD_AXIS: 010
OS_AXIS: 010
OS_AXIS: 180
OS_VA1: 20/20
OD_AXIS: 010
OD_ADD: +3.50
OD_VA1: 20/20
OD_ADD: +3.25
OS_VA1: 20/20
OD_VA1: 20/20
OS_ADD: +3.25
OD_CYLINDER: +1.50
OD_CYLINDER: +1.50
OD_SPHERE: -1.00
OS_ADD: +3.50
OS_SPHERE: -1.00
OD_SPHERE: -1.00

## 2024-11-19 ASSESSMENT — REFRACTION_CURRENTRX
OD_ADD: +3.00
OS_CYLINDER: +0.75
OS_OVR_VA: 20/
OD_CYLINDER: +1.50
OD_VPRISM_DIRECTION: PROGS
OS_ADD: +3.00
OD_AXIS: 009
OS_VPRISM_DIRECTION: PROGS
OD_SPHERE: -1.00
OD_OVR_VA: 20/
OS_AXIS: 006
OS_SPHERE: -0.75

## 2024-11-19 ASSESSMENT — KERATOMETRY
METHOD_AUTO_MANUAL: AUTO
OS_K2POWER_DIOPTERS: 44.25
OD_K2POWER_DIOPTERS: 44.50
OS_K1POWER_DIOPTERS: 43.50
OD_AXISANGLE_DEGREES: 021
OD_K1POWER_DIOPTERS: 43.50
OS_AXISANGLE_DEGREES: 174

## 2024-11-19 ASSESSMENT — CONFRONTATIONAL VISUAL FIELD TEST (CVF)
OD_FINDINGS: FULL
OS_FINDINGS: FULL

## 2024-11-19 ASSESSMENT — REFRACTION_AUTOREFRACTION
OS_AXIS: 179
OD_CYLINDER: +2.25
OD_SPHERE: -0.50
OD_AXIS: 008
OS_SPHERE: -0.75
OS_CYLINDER: +1.75

## 2024-11-19 ASSESSMENT — LID POSITION - COMMENTS
OS_COMMENTS: BLEPHAROCHALASIS
OD_COMMENTS: BLEPHAROCHALASIS

## 2024-12-17 ENCOUNTER — DOCTOR'S OFFICE (OUTPATIENT)
Facility: LOCATION | Age: 64
Setting detail: OPHTHALMOLOGY
End: 2024-12-17
Payer: COMMERCIAL

## 2024-12-17 DIAGNOSIS — H11.153: ICD-10-CM

## 2024-12-17 DIAGNOSIS — G43.109: ICD-10-CM

## 2024-12-17 DIAGNOSIS — H02.34: ICD-10-CM

## 2024-12-17 DIAGNOSIS — H02.31: ICD-10-CM

## 2024-12-17 DIAGNOSIS — B97.7: ICD-10-CM

## 2024-12-17 DIAGNOSIS — H52.4: ICD-10-CM

## 2024-12-17 PROCEDURE — 92014 COMPRE OPH EXAM EST PT 1/>: CPT | Performed by: OPHTHALMOLOGY

## 2024-12-17 PROCEDURE — 92015 DETERMINE REFRACTIVE STATE: CPT | Performed by: OPHTHALMOLOGY

## 2024-12-17 ASSESSMENT — REFRACTION_MANIFEST
OD_SPHERE: -1.00
OD_CYLINDER: +1.75
OS_ADD: +3.25
OD_VA1: 20/20
OD_ADD: +3.50
OD_AXIS: 010
OS_CYLINDER: +1.00
OS_SPHERE: -1.00
OD_ADD: +3.50
OS_CYLINDER: +0.75
OD_SPHERE: -1.00
OS_AXIS: 010
OS_AXIS: 010
OD_SPHERE: -1.00
OD_AXIS: 010
OS_VA1: 20/20
OD_ADD: +3.25
OD_CYLINDER: +1.50
OS_ADD: +3.50
OS_AXIS: 180
OS_CYLINDER: +1.00
OD_VA1: 20/20
OD_AXIS: 010
OS_VA1: 20/20
OS_SPHERE: -1.00
OS_ADD: +3.50
OD_CYLINDER: +1.50
OS_SPHERE: -1.00

## 2024-12-17 ASSESSMENT — REFRACTION_CURRENTRX
OS_AXIS: 006
OD_CYLINDER: +1.50
OD_SPHERE: -1.00
OS_CYLINDER: +0.75
OD_VPRISM_DIRECTION: PROGS
OD_OVR_VA: 20/
OS_OVR_VA: 20/
OS_SPHERE: -0.75
OS_VPRISM_DIRECTION: PROGS
OD_AXIS: 009
OS_ADD: +3.00
OD_ADD: +3.00

## 2024-12-17 ASSESSMENT — KERATOMETRY
OS_K1POWER_DIOPTERS: 44.00
OD_AXISANGLE_DEGREES: 022
OD_K1POWER_DIOPTERS: 43.75
OS_K2POWER_DIOPTERS: 44.00
METHOD_AUTO_MANUAL: AUTO
OD_K2POWER_DIOPTERS: 44.75
OS_AXISANGLE_DEGREES: 090

## 2024-12-17 ASSESSMENT — REFRACTION_AUTOREFRACTION
OS_SPHERE: -1.00
OD_CYLINDER: +2.00
OS_AXIS: 004
OD_AXIS: 011
OS_CYLINDER: +1.50
OD_SPHERE: -1.00

## 2024-12-17 ASSESSMENT — TONOMETRY
OD_IOP_MMHG: 14
OS_IOP_MMHG: 14

## 2024-12-17 ASSESSMENT — CONFRONTATIONAL VISUAL FIELD TEST (CVF)
OD_FINDINGS: FULL
OS_FINDINGS: FULL

## 2024-12-17 ASSESSMENT — LID POSITION - COMMENTS
OD_COMMENTS: BLEPHAROCHALASIS W/ HOODING
OS_COMMENTS: BLEPHAROCHALASIS W/ HOODING

## 2024-12-17 ASSESSMENT — VISUAL ACUITY
OS_BCVA: 20/25+
OD_BCVA: 20/20-2